# Patient Record
Sex: MALE | Race: BLACK OR AFRICAN AMERICAN
[De-identification: names, ages, dates, MRNs, and addresses within clinical notes are randomized per-mention and may not be internally consistent; named-entity substitution may affect disease eponyms.]

---

## 2017-03-19 ENCOUNTER — HOSPITAL ENCOUNTER (EMERGENCY)
Dept: HOSPITAL 61 - ER | Age: 37
LOS: 1 days | Discharge: HOME | End: 2017-03-20
Payer: OTHER GOVERNMENT

## 2017-03-19 VITALS — WEIGHT: 212 LBS | HEIGHT: 72 IN | BODY MASS INDEX: 28.71 KG/M2
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
## 2017-03-19 DIAGNOSIS — M25.561: Primary | ICD-10-CM
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
## 2017-03-19 PROCEDURE — 73564 X-RAY EXAM KNEE 4 OR MORE: CPT

## 2017-03-19 PROCEDURE — 29505 APPLICATION LONG LEG SPLINT: CPT

## 2017-03-20 VITALS — DIASTOLIC BLOOD PRESSURE: 75 MMHG | SYSTOLIC BLOOD PRESSURE: 156 MMHG

## 2017-03-20 NOTE — ED.ADGEN
Past Medical History


Past Medical History:  No Pertinent History


Past Surgical History:  Other


Additional Past Surgical Histo:  right knee sx


Alcohol Use:  None


Drug Use:  None





Adult General


Chief Complaint


Chief Complaint:  MECHANICAL FALL





HPI


HPI


Patient is a 36  year old man, who presents to the emergency department with a 

complaint of right knee pain. Patient states that he was pivoting on his leg, 

when he slipped on a puddle of water, his leg twisted underneath him and he did 

fall down, catching himself on a chair. Patient states that when his knee 

twisted he heard a "pop", and felt immediate pain along the medial aspect of 

his knee. Patient denies any previous injuries, denies any other complaints 

this time. Did not strike his head or neck. Patient was unable to bear weight. 

Has not taken any medication prior to coming to the ED.





Review of Systems


Review of Systems


Constitutional:  Denies fever or chills. []


Eyes:  Denies change in visual acuity. []


HENT:  Denies nasal congestion or sore throat. [] 


Respiratory:  Denies cough or shortness of breath. [] 


Cardiovascular:  Denies chest pain or edema. [] 


GI:  Denies abdominal pain, nausea, vomiting, bloody stools or diarrhea. [] 


:  Denies dysuria. [] 


Musculoskeletal:  Denies back pain, pain in the medial aspect of the right knee.


Integument:  Denies rash. [] 


Neurologic:  Denies headache, focal weakness or sensory changes. [] 


Endocrine:  Denies polyuria or polydipsia. [] 


Lymphatic:  Denies swollen glands. [] 


Psychiatric:  Denies depression or anxiety. []





Current Medications


Current Medications





 Current Medications








 Medications


  (Trade)  Dose


 Ordered  Sig/Gagan  Start Time


 Stop Time Status Last Admin


Dose Admin


 


 Cyclobenzaprine


 HCl


  (Flexeril)  10 mg  1X  ONCE  3/20/17 02:00


 3/20/17 02:01 DC 3/20/17 01:45


10 MG


 


 Naproxen


  (Naprosyn)  250 mg  1X  ONCE  3/20/17 02:00


 3/20/17 02:01 DC 3/20/17 01:46


250 MG











Allergies


Allergies





 Allergies








Coded Allergies Type Severity Reaction Last Updated Verified


 


  No Known Drug Allergies    3/19/17 No











Physical Exam


Physical Exam





Constitutional: Well developed, well nourished, no acute distress, non-toxic 

appearance. []


HENT: Normocephalic, atraumatic, bilateral external ears normal, oropharynx 

moist, no oral exudates, nose normal. []


Eyes: PERRLA, EOMI, conjunctiva normal, no discharge. [] 


Neck: Normal range of motion, no tenderness, supple, no stridor. [] 


Cardiovascular:Heart rate regular rhythm, no murmur, S1, S2, rubs or gallops. []


Lungs & Thorax:  Bilateral breath sounds clear to auscultation, no wheezing, 

rhonchi, rales. []


Abdomen: Bowel sounds normal, soft, no tenderness, no masses, no pulsatile 

masses. [] 


Skin: Warm, dry, no erythema, no rash. [] 


Back: No tenderness, no CVA tenderness. [] 


Extremities: Patient with tenderness to palpation along the medial aspect of 

the right knee, no bony point tenderness, no crepitus, no deformity, no 

effusion identified. Negative drawer test anterior and posterior. Pulses are 

intact bilaterally. No other areas of tenderness or abnormalities identified. 

No abrasions or lacerations. Skin is normal in appearance.


Neurologic: Alert and oriented X 3, normal motor function, normal sensory 

function, no focal deficits noted. []


Psychologic: Affect normal, judgement normal, mood normal. []





Current Patient Data


Vital Signs





 Vital Signs








  Date Time  Temp Pulse Resp B/P Pulse Ox O2 Delivery O2 Flow Rate FiO2


 


3/20/17 01:42  98 16 156/75 98 Room Air  


 


3/19/17 23:50 98.1       





 98.1       











EKG


EKG


Not indicated. []





Radiology/Procedures


Radiology/Procedures


Right knee x-ray: 4 view: No fracture or subluxation, no effusion, no soft 

tissue or bony abnormalities identified. As interpreted by me. []





Course & Med Decision Making


Course & Med Decision Making


Pertinent Labs and Imaging studies reviewed. (See chart for details)





Patient without bony point tenderness or crepitus, x-ray did not reveal any 

evidence of acutely concerning findings. Patient unable to weight-bear, pain 

with flexion and extension. Placed in knee immobilizer for comfort after enemas 

were trial in the ED. Patient received crutches, crutch walking instructions, 

demonstrated crutch walking ability without issue in the ED. Patient received 

naproxen, cycle but the pain, I did discuss with the patient that his symptoms 

are consistent with a sprain, patient states he developed an area, will be able 

to follow-up with orthopedist near his home if symptoms persist. To return to 

the ED for new or worsening symptoms as discussed at bedside, continue use of 

naproxen and cyclobenzaprine as needed along with ice and activity. Discharged 

home in stable condition with plan as above.





Dragon Disclaimer


Dragon Disclaimer


This electronic medical record was generated, in whole or in part, using a 

voice recognition dictation system.





Departure


Impression:  


 Primary Impression:  


 Knee pain, right


Disposition:  01 HOME, SELF-CARE


Condition:  IMPROVED


Scripts


Naproxen 250 Mg [id number] Mg PO BID PRN PAIN #10 


   Prov:YONG DELAROSA DO         3/20/17


Cyclobenzaprine Hcl 10 Mg [id number] Mg PO TID PRN MUSCLE PAIN #12 TAB


   Prov:YONG DELAROSA DO         3/20/17








YONG DELAROSA DO Mar 20, 2017 06:42

## 2021-05-08 ENCOUNTER — HOSPITAL ENCOUNTER (EMERGENCY)
Dept: HOSPITAL 63 - ER | Age: 41
Discharge: HOME | End: 2021-05-08
Payer: OTHER GOVERNMENT

## 2021-05-08 VITALS — DIASTOLIC BLOOD PRESSURE: 99 MMHG | SYSTOLIC BLOOD PRESSURE: 161 MMHG

## 2021-05-08 VITALS — HEIGHT: 72 IN | BODY MASS INDEX: 28.49 KG/M2 | WEIGHT: 210.32 LBS

## 2021-05-08 DIAGNOSIS — Z91.018: ICD-10-CM

## 2021-05-08 DIAGNOSIS — R05: ICD-10-CM

## 2021-05-08 DIAGNOSIS — J30.2: Primary | ICD-10-CM

## 2021-05-08 DIAGNOSIS — J02.9: ICD-10-CM

## 2021-05-08 PROCEDURE — 99284 EMERGENCY DEPT VISIT MOD MDM: CPT

## 2021-05-08 PROCEDURE — 87880 STREP A ASSAY W/OPTIC: CPT

## 2021-05-08 PROCEDURE — 71045 X-RAY EXAM CHEST 1 VIEW: CPT

## 2021-05-08 PROCEDURE — 87070 CULTURE OTHR SPECIMN AEROBIC: CPT

## 2021-05-08 NOTE — PHYS DOC
Past History


Past Medical History:  Other


Additional Past Medical Histor:  SEASONAL ALLERGIES


Past Surgical History:  Other


Additional Past Surgical Histo:  LEFT KNEE


Alcohol Use:  Rarely


Drug Use:  None





General Adult


EDM:


Chief Complaint:  COUGH





HPI:


HPI:


40-year-old male presents with 2-day history of cough and sore throat.  The 

patient has some pain with swallowing.  His throat also feels itchy.  He is most

concerned about his persistent cough despite cough drops.  He has not been 

vaccinated for COVID-19.  He has no known exposures.  He denies fever or chills.

 He has no other symptoms at this time.





Review of Systems:


Review of Systems:


Constitutional:  Denies fever or chills 


Eyes:  Denies change in visual acuity 


HENT: Sore throat


Respiratory: Cough with whitish sputum


Cardiovascular:  Denies chest pain or edema 


GI:  Denies abdominal pain, nausea, vomiting, bloody stools or diarrhea 


: Denies dysuria 


Musculoskeletal:  Denies back pain or joint pain 


Integument:  Denies rash 


Neurologic:  Denies headache, focal weakness or sensory changes 


Endocrine:  Denies polyuria or polydipsia 


Lymphatic:  Denies swollen glands 


Psychiatric:  Denies depression or anxiety





Allergies:


Allergies:





Allergies








Coded Allergies Type Severity Reaction Last Updated Verified


 


  avocado Allergy Unknown  1/14/17 Yes











Physical Exam:


PE:





Constitutional: Well developed, well nourished, no acute distress, non-toxic 

appearance. []


HENT: Normocephalic, atraumatic, bilateral external ears normal, oropharynx 

erythematous without oral exudates, nose normal. []


Eyes: PERRLA, EOMI, conjunctiva normal, no discharge. [] 


Neck: Normal range of motion, no tenderness, supple, no stridor. [] 


Cardiovascular: Heart rate regular rhythm, no murmur []


Lungs & Thorax:  Bilateral breath sounds clear to auscultation []


Abdomen: Bowel sounds normal, soft, no tenderness, no masses, no pulsatile 

masses. [] 


Skin: Warm, dry, no erythema, no rash. [] 


Back: No tenderness, no CVA tenderness. [] 


Extremities: No tenderness, no cyanosis, no clubbing, ROM intact, no edema. [] 


Neurologic: Alert and oriented X 3, normal motor function, normal sensory 

function, no focal deficits noted. []


Psychologic: Affect normal, judgement normal, mood normal. []





Current Patient Data:


Vital Signs:





                                   Vital Signs








  Date Time  Temp Pulse Resp B/P (MAP) Pulse Ox O2 Delivery O2 Flow Rate FiO2


 


5/8/21 20:29 98.0 97 18 161/99 (119) 99 Room Air  











EKG:


EKG:


[]





Radiology/Procedures:


Radiology/Procedures:


[]


Impressions:


Chest AP portable at 2029:





Reason for examination: Cough.





The heart size is normal. Mediastinum is unremarkable. Lung fields are clear. No

 acute bony abnormalities are seen.





Impression:





No acute cardiopulmonary disease.





Electronically signed by: Jade French MD (5/8/2021 8:53 PM) [facility]














DICTATED AND SIGNED BY:     JADE FRENCH MD


DATE:     05/08/21 2052





CC: YONI MA DO; RITA NAVARRO DO ~MTH0 0





Heart Score:


C/O Chest Pain:  N/A


Risk Factors:


Risk Factors:  DM, Current or recent (<one month) smoker, HTN, HLP, family 

history of CAD, obesity.


Risk Scores:


Score 0 - 3:  2.5% MACE over next 6 weeks - Discharge Home


Score 4 - 6:  20.3% MACE over next 6 weeks - Admit for Clinical Observation


Score 7 - 10:  72.7% MACE over next 6 weeks - Early Invasive Strategies





Course & Med Decision Making:


Course & Med Decision Making


Pertinent Labs and Imaging studies reviewed. (See chart for details)


Patient's chest x-ray is negative for acute findings.  His rapid strep is 

negative.  This is likely related to his allergies.  I have encouraged him to 

take his allergy medication.  I will give him a Tessalon Perle in the ED as well

 as a prescription for that and codeine-based cough syrup.  He is stable for 

discharge at this time.


[]





Dragon Disclaimer:


Dragon Disclaimer:


This electronic medical record was generated, in whole or in part, using a voice

 recognition dictation system.





Departure


Departure:


Impression:  


   Primary Impression:  


   Cough


   Additional Impression:  


   Seasonal allergies


Disposition:  01 HOME / SELF CARE / HOMELESS


Condition:  STABLE


Referrals:  


YONI MA DO (PCP)


Patient Instructions:  Cough, Adult, Easy-to-Read


Scripts


Guaifenesin/Codeine Phosphate (Codeine-Guaifen  mg/5 ml) 120 Ml Liquid


5 ML PO PRN Q6HRS PRN for cough and congestion MDD 20 Milliliter(s) for 6 Days, 

#120 ML 0 Refills


   Prov: RITA NAVARRO DO         5/8/21 


Benzonatate (TESSALON PERLE) 100 Mg Capsule


1 CAP PO TID PRN for COUGH, #30 CAP


   Prov: RITA NAVARRO DO         5/8/21











RITA NAVARRO DO                  May 8, 2021 20:47

## 2021-05-08 NOTE — RAD
Chest AP portable at 2029:



Reason for examination: Cough.



The heart size is normal. Mediastinum is unremarkable. Lung fields are clear. No acute bony abnormali
ties are seen.



Impression:



No acute cardiopulmonary disease.



Electronically signed by: Jade Sanchez MD (5/8/2021 8:53 PM) [facility]

## 2021-11-24 ENCOUNTER — HOSPITAL ENCOUNTER (EMERGENCY)
Dept: HOSPITAL 63 - ER | Age: 41
Discharge: LEFT BEFORE DISCHARGE | End: 2021-11-24
Payer: OTHER GOVERNMENT

## 2021-11-24 VITALS — HEIGHT: 72 IN | WEIGHT: 217.38 LBS | BODY MASS INDEX: 29.44 KG/M2

## 2021-11-24 VITALS — DIASTOLIC BLOOD PRESSURE: 90 MMHG | SYSTOLIC BLOOD PRESSURE: 196 MMHG

## 2021-11-24 DIAGNOSIS — R51.9: ICD-10-CM

## 2021-11-24 DIAGNOSIS — Z91.018: ICD-10-CM

## 2021-11-24 DIAGNOSIS — I10: Primary | ICD-10-CM

## 2021-11-24 PROCEDURE — 70450 CT HEAD/BRAIN W/O DYE: CPT

## 2021-11-24 NOTE — PHYS DOC
Past History


Past Medical History:  Other


Additional Past Medical Histor:  SEASONAL ALLERGIES


Past Surgical History:  Other


Additional Past Surgical Histo:  LEFT KNEE


Alcohol Use:  Rarely


Drug Use:  None





General Adult


EDM:


Chief Complaint:  HYPERTENSION





HPI:


HPI:





Patient is a 41 year old male who presents for evaluation of chronic 

hypertension.  He has chronic hypertension and reportedly takes Micardis.  He 

admits to missing doses relatively often, and he reports he took his dose last 

night.  He was at work and was undergoing a "physical" in order to be able to 

perform certain duties.  His blood pressure was noted to be elevated, so he was 

told to leave and go see his doctor.  He contacted his primary care doctor's 

office and since he cannot be seen in person he elected to try to go to either 

urgent care, who declined him or come to the ER, so he is here now.  He reports 

having a headache, which was not thunderclap in nature, admittedly is like other

headaches he had previously.  He took Tylenol and reports that the headache is 

improving.  He denies fall, head injury, syncope, focal weakness, numbness or 

tingling, vision loss, neck pain or stiffness, chest pain, shortness of breath, 

palpitations, nausea vomiting, abdominal pain.  He admits that he does not check

his blood pressure very often, but when he does it is always elevated.  He 

admits that his diastolic is more often over 100.  He has not contacted his 

primary care physician to discuss this issue recently.





Review of Systems:


Review of Systems:


Constitutional:  Denies fever or chills 


Eyes:  Denies change in visual acuity denies vision loss


HENT:  Denies nasal congestion or sore throat 


Respiratory:  Denies cough or shortness of breath 


Cardiovascular:  Denies chest pain or edema 


GI:  Denies abdominal pain, nausea, vomiting


Musculoskeletal:  Denies back pain or joint pain 


Integument:  Denies rash 


Neurologic: Admits to headache.  Denies numbness, tingling, focal weakness, 

dizziness, vertigo, syncope.  Denies head injury or trauma.


Psychiatric:  Denies depression or anxiety





Allergies:


Allergies:





Allergies








Coded Allergies Type Severity Reaction Last Updated Verified


 


  avocado Allergy Unknown  17 Yes











Physical Exam:


PE:





Constitutional: Well developed, well nourished, no acute distress, non-toxic 

appearance. []


HENT: Normocephalic, atraumatic, bilateral external ears normal, oropharynx 

moist, no oral exudates, nose normal. []


Eyes: PERRL, EOMI, conjunctiva normal, no discharge.  No nystagmus.  Sclera are 

clear and anicteric, conjunctival noninjected


Neck: Normal range of motion, no tenderness, supple, no stridor.  No JVD.  

Trachea is midline.  No meningismus.


Cardiovascular:Heart rate regular rhythm, no murmur +2 radial and posterior 

tibial pulses bilaterally


Lungs & Thorax:  Bilateral breath sounds clear to auscultation []


Abdomen: Abdomen soft, nondistended, nontender.


Skin: Warm, dry, no erythema, no rash. [] 


Back: No tenderness, no CVA tenderness. [] 


Extremities: No tenderness, no cyanosis, no clubbing, ROM intact, no edema. [] 


Neurologic: He is awake, alert, oriented x3.  Cranial nerves II through XII 

grossly intact.  5 out of 5 motor strength all 4 extremities.  No limb ataxia.  

No pronator drift.  No dysmetria.  Speech is clear and fluent.  Sensation is 

grossly intact.  Ambulatory with a steady gait.  Nonfocal neurologic exam.


Psychologic: He is mildly anxious but cooperative.  []





EKG:


EKG:


[]





Radiology/Procedures:


Radiology/Procedures:


                                 IMAGING REPORT





                                     Signed





PATIENT: INDIRA DOTY     ACCOUNT: [id number]     MRN#: [id number]


: 1980           LOCATION: ER              AGE: 41


SEX: M                    EXAM DT: 21         ACCESSION#: [id number]


STATUS: REG ER            ORD. PHYSICIAN: LAURA BELLE DO


REASON: headache, HTN


PROCEDURE: CT HEAD WO CONTRAST








INDICATION: Reason: headache, HTN / Spl. Instructions:  / History: 





COMPARISON: None.





TECHNIQUE: 





Axial CT images obtained through the head without intravenous contrast.





One or more of the following individualized dose reduction techniques were 

utilized for this examination:  1. Automated exposure control;  2. Adjustment of

 the mA and/or kV according to patient size;  3. Use of iterative reconstruction

 technique.





FINDINGS:





No intracranial hemorrhage.


No significant  midline shift. 


Ventricles and sulci are unremarkable.


No acute osseous abnormality.


High density at multiple vessels which can be seen with calcific ath

erosclerosis.





IMPRESSION:





*   No acute intracranial hemorrhage.





*  High density is seen within the vessels of both the posterior and anterior 

circulation. Given distribution most likely cause would be a diffuse process 

such as calcific atherosclerosis or increased hematocrit rather than dense 

vessel from thrombus which typically has a more focal appearance.





*  Opacification of left side of the frontal sinus which could be from 

congestion or sinusitis. There is also opacification of mastoid air cells gabo

aterally which can be seen with mastoid effusion or mastoiditis. Correlate with 

symptoms..





Electronically signed by: Stephany Mcduffie MD (2021 11:49 AM) DESK[facility]-

[id number]














DICTATED AND SIGNED BY:     STEPHANY MCDUFFIE MD


DATE:     21 1139





CC: YONI MA DO; LAURA BELLE DO ~MTH0 0





Heart Score:


C/O Chest Pain:  No


Risk Factors:


Risk Factors:  DM, Current or recent (<one month) smoker, HTN, HLP, family histo

ry of CAD, obesity.


Risk Scores:


Score 0 - 3:  2.5% MACE over next 6 weeks - Discharge Home


Score 4 - 6:  20.3% MACE over next 6 weeks - Admit for Clinical Observation


Score 7 - 10:  72.7% MACE over next 6 weeks - Early Invasive Strategies





Course & Med Decision Making:


Course & Med Decision Making


Pertinent Labs and Imaging studies reviewed. (See chart for details)





The patient is given p.o. ibuprofen for his headache.  I have discussed the 

management of chronic hypertension.  There is no current indication for emergent

 lowering of his blood pressure.  CT of the head is unremarkable for any acute 

life-threatening process.  He has a nonfocal neurologic exam.  He has no other 

associated complaints such as chest pain or dyspnea or weakness.  I recommend 

that he contact his PCPs office today to arrange for definitive follow-up.  We 

will add p.o. metoprolol to his regimen.  I told him to keep an appropriate log 

of his blood pressure readings at home.  I recommended compliance with 

medications and diet, low-sodium diet.  Return precautions are given.  He 

verbalizes understanding.





Dragon Disclaimer:


Dragon Disclaimer:


This electronic medical record was generated, in whole or in part, using a voice

 recognition dictation system.





Departure


Departure:


Impression:  


   Primary Impression:  


   Chronic hypertension


   Additional Impression:  


   Headache


Disposition:   LEFT AWOL/ELOPED


Condition:  STABLE


Referrals:  


YONI MA DO (PCP)


Patient Instructions:  Hypertension





Additional Instructions:  


Please take the medications as directed.  Eat a low-sodium, heart healthy diet. 

 Stay well-hydrated.  You may take Tylenol and ibuprofen for pain.  Keep a log 

of your blood pressure readings at home.  Contact your primary care physician 

and arrange for follow-up.  Return to the ER for chest pain, shortness of 

breath, fall or head injury, for focal weakness, vomiting, more severe headache 

pain, or for any other concerns.  Please stop smoking tobacco.


Scripts


Metoprolol Tartrate (METOPROLOL TARTRATE) 25 Mg Tablet


1 TAB PO BID for hypertension, #60 TAB 1 Refill


   Prov: LAURA BELLE DO         21











LAURA BELLE DO             2021 11:07

## 2021-11-24 NOTE — RAD
INDICATION: Reason: headache, HTN / Spl. Instructions:  / History: 



COMPARISON: None.



TECHNIQUE: 



Axial CT images obtained through the head without intravenous contrast.



One or more of the following individualized dose reduction techniques were utilized for this examinat
ion:  1. Automated exposure control;  2. Adjustment of the mA and/or kV according to patient size;  3
. Use of iterative reconstruction technique.



FINDINGS:



No intracranial hemorrhage.

No significant  midline shift. 

Ventricles and sulci are unremarkable.

No acute osseous abnormality.

High density at multiple vessels which can be seen with calcific atherosclerosis.



IMPRESSION:



*   No acute intracranial hemorrhage.



*  High density is seen within the vessels of both the posterior and anterior circulation. Given dist
ribution most likely cause would be a diffuse process such as calcific atherosclerosis or increased h
ematocrit rather than dense vessel from thrombus which typically has a more focal appearance.



*  Opacification of left side of the frontal sinus which could be from congestion or sinusitis. There
 is also opacification of mastoid air cells bilaterally which can be seen with mastoid effusion or ma
stoiditis. Correlate with symptoms..



Electronically signed by: Ravinder Hurtado MD (11/24/2021 11:49 AM) DESKTOP-[id number]

## 2022-02-11 ENCOUNTER — HOSPITAL ENCOUNTER (EMERGENCY)
Dept: HOSPITAL 63 - ER | Age: 42
Discharge: HOME | End: 2022-02-11
Payer: OTHER GOVERNMENT

## 2022-02-11 VITALS — WEIGHT: 217.38 LBS | HEIGHT: 72 IN | BODY MASS INDEX: 29.44 KG/M2

## 2022-02-11 VITALS — SYSTOLIC BLOOD PRESSURE: 158 MMHG | DIASTOLIC BLOOD PRESSURE: 98 MMHG

## 2022-02-11 DIAGNOSIS — Z91.018: ICD-10-CM

## 2022-02-11 DIAGNOSIS — M25.512: Primary | ICD-10-CM

## 2022-02-11 PROCEDURE — 73030 X-RAY EXAM OF SHOULDER: CPT

## 2022-02-11 PROCEDURE — 99283 EMERGENCY DEPT VISIT LOW MDM: CPT

## 2022-02-11 NOTE — RAD
Exam: Left shoulder 3 views



INDICATION: Shoulder pain



TECHNIQUE: Frontal view of the left shoulder with internal and external rotation and transscapular Y 
views



Comparisons: None



FINDINGS:

Bone mineralization is normal. No acute or healed fractures. Soft tissues are unremarkable. Joint spa
sheree are well-maintained.



IMPRESSION:

No acute osseous abnormality.



Electronically signed by: Ruthann Carrington MD (2/11/2022 8:36 PM) [facility]

## 2022-02-11 NOTE — PHYS DOC
Past History


Past Medical History:  Other


Additional Past Medical Histor:  SEASONAL ALLERGIES


Past Surgical History:  Other


Additional Past Surgical Histo:  LEFT KNEE


Alcohol Use:  None


Drug Use:  None





Adult General


Chief Complaint


Chief Complaint:  SHOULDER INJURY





HPI


HPI


Patient is a 41-year-old male who presents with left shoulder pain that is been 

going on about a week, pain 7 out of 10, dull and achy in nature.  Denies any 

recent travels, traumas, injuries, fevers, chest pain, shortness of breath, 

abdominal pain, nausea, vomiting, diarrhea.  States he is in the  and 

does PT quite often and has had pain similar to this in the past.  States he did

not take any pain medicine today.  States he has not called his primary care 

physician's office.





Review of Systems


Review of Systems


Review of systems otherwise unremarkable except noted in HPI





Allergies


Allergies





Allergies








Coded Allergies Type Severity Reaction Last Updated Verified


 


  avocado Allergy Unknown  11/24/21 Yes











Physical Exam


Physical Exam





Constitutional: Well developed, well nourished, no acute distress, non-toxic 

appearance. []


HENT: Normocephalic, atraumatic, 


Neck: Normal range of motion, 


Cardiovascular:Heart rate regular rhythm, no murmur []


Lungs & Thorax:  Bilateral breath sounds clear to auscultation []


Back: No tenderness, 


Extremities: Generalized tenderness of the left shoulder with no obvious 

bruising, deformities.  Pain with passive and active range of motion but range 

of motion intact and neurovascular exam intact


Neurologic: Alert and oriented X 3, normal motor function, normal sensory 

function, no focal deficits noted. []


Psychologic: Affect normal, judgement normal, mood normal. []





EKG


EKG


[]





Radiology/Procedures


Radiology/Procedures


[]





Heart Score


C/O Chest Pain:  No


Risk Factors:


Risk Factors:  DM, Current or recent (<one month) smoker, HTN, HLP, family 

history of CAD, obesity.


Risk Scores:


Risk Factors:  DM, Current or recent (<one month) smoker, HTN, HLP, family 

history of CAD, obesity.





Course & Med Decision Making


Course & Med Decision Making


Patient is a 41-year-old male who presents with left shoulder pain for a week to

 10 days


Vital signs not concerning.  Physical exam noted above.  Given Tylenol, ib

uprofen, ice and sling.


Imaging with no acute osseous abnormalities.  Discussed findings with patient.  

Discussed symptom control at home.


Advised to call primary care physician on Monday to update on ED visit and 

discuss need for MRI.


Gave return cautions to the ED.  Patient grateful, verbalized understanding and 

agreed with plan of discharge.





[]





Dragon Disclaimer


Dragon Disclaimer


This electronic medical record was generated, in whole or in part, using a voice

 recognition dictation system.





Departure


Departure:


Impression:  


   Primary Impression:  


   Shoulder pain


Disposition:  01 HOME / SELF CARE / HOMELESS


Condition:  GOOD


Referrals:  


YONI MA DO (PCP)


Patient Instructions:  RICE - Routine Care for Injuries, Shoulder Pain





Additional Instructions:  


Thank you for coming into the emergency department tonight and allowing us to 

take care of you.  Please read the attached information carefully to go over 

things we discussed.  You can continue to use Tylenol, ibuprofen, ice and your 

sling as needed until you see your primary care physician in a changer 

treatment.  Please call your primary care physician on Monday to update on your 

ED visit, set up a follow-up appointment and discuss need for further evaluation

 and treatment such as MRI.  Please come back with new or concerning symptoms as

 discussed.











VARGHESE BAE MD               Feb 11, 2022 20:19